# Patient Record
Sex: FEMALE | Race: WHITE
[De-identification: names, ages, dates, MRNs, and addresses within clinical notes are randomized per-mention and may not be internally consistent; named-entity substitution may affect disease eponyms.]

---

## 2020-12-04 ENCOUNTER — HOSPITAL ENCOUNTER (OUTPATIENT)
Dept: HOSPITAL 95 - MHTC | Age: 70
Discharge: HOME | End: 2020-12-04
Attending: INTERNAL MEDICINE
Payer: COMMERCIAL

## 2020-12-04 VITALS — WEIGHT: 281.53 LBS | BODY MASS INDEX: 49.88 KG/M2 | HEIGHT: 62.99 IN

## 2020-12-04 DIAGNOSIS — Z79.899: ICD-10-CM

## 2020-12-04 DIAGNOSIS — Z88.5: ICD-10-CM

## 2020-12-04 DIAGNOSIS — I35.0: Primary | ICD-10-CM

## 2020-12-04 DIAGNOSIS — E66.01: ICD-10-CM

## 2020-12-04 DIAGNOSIS — F41.9: ICD-10-CM

## 2020-12-04 DIAGNOSIS — I25.10: ICD-10-CM

## 2020-12-04 DIAGNOSIS — I10: ICD-10-CM

## 2020-12-04 PROCEDURE — B201YZZ PLAIN RADIOGRAPHY OF MULTIPLE CORONARY ARTERIES USING OTHER CONTRAST: ICD-10-PCS | Performed by: INTERNAL MEDICINE

## 2020-12-04 PROCEDURE — 4A023N7 MEASUREMENT OF CARDIAC SAMPLING AND PRESSURE, LEFT HEART, PERCUTANEOUS APPROACH: ICD-10-PCS | Performed by: INTERNAL MEDICINE

## 2020-12-04 NOTE — NUR
RIGHT RADIAL TR BAND HAS BEEN FULLY DEFLATED, SITE IS CLEAN AND DRY. NO
BLEEDING OR SWELLING NOTED. PT DENIES PAIN OR DISCOMFORT. CALL LIGHT IN REACH.

## 2020-12-04 NOTE — NUR
PT BACK TO RECOVERY ROOM VIA RECLINER AFTER PROCEDURE. RIGHT RADIAL TR BAND
AND SPLINT IN PLACE ON RIGHT WRIST. SITE IS CLEAN AND DRY, NO BLEEDING OR
SWELLING NOTED.

## 2020-12-04 NOTE — NUR
IV DC'D, CATH INTACT. PT GIVEN DC INSTRUCTIONS, FOLLOW UP INFORMATION.
VERBALIZED UNDERSTANDING. RIGHT RADIAL SITE REMAINS SOFT AND NON-TENDER. NO
BLEEDING OR SWELLING NOTED. PT OUT TO CAR VIA WHEELCHAIR.

## 2024-08-14 NOTE — NUR
PT ADMITTED FOR CORS, UPON ARRIVAL PT SOB WITH SATS 86-89% ON RA, LABORED
BREATHING. PT STATES SHE IS ALLERGIC TO MAG AND K+, HAS NOT BEEN TAKING K+
WITH LASIX, DOES NOT ALWAYS TAKE LASIX AS PRESCRIBED PER PT REPORT. PT
ANXIOUS. C/O PAIN TO LOWER LEGS, LEGS MILDLY WARM/RED W OPEN SORES; PT STATES
LEGS ARE IMPROVED. PT PLACED ON O2 AT 2L VIA N/C. SATS 88-92%, PT HOLDS HER
BREATH AT TIMES. DR PITT NOTIFIED/UPDATED. BNP ORDERED. PT TO BE ADMITTED
TO HOSPITALIST SERVICE. PT'S SON UPDATED PER PT REQUEST, DR PITT SPOKE
WITH PT'S SON VIA PHONE.

## 2024-08-14 NOTE — NUR
ASSUMED CARE OF PT FROM CATH LAB RN AT 1117 AM WHEN SHE ARRIVED TO PCU 10. SEE
DOCUMENTED VS AND ASSESSMENT. DR MCKEON IN ROOM TO SEE PT AND ORDERS CLARIFIED
WITH HER BY THIS RN. PT GIVEN FIRST DOSE OF BUMEX AND HAS HAD 1800ML OF URINE
OUTPUT SINCE. BP HAS DECREASED /66 MAP 76, AND THIS RN ATTEMPTED TO
CONTACT DR MCKEON FOR HOLDING PARAMETERS FOR BUMEX IV PUSH AS LOWERING
BP/OVER-DIRUESIS IN CRITICAL AORTIC STENOSIS CAN CAUSE PT TO DECOMPENSATE
FURTHER D/T LOSS OF PRE-LOAD. PT IS ABLE TO USE CALL LIGHT FOR NEEDS, ALERT
AND ORIENTED X 4. WILL CONTINUE TO MONITOR AND GIVE REPORT TO NOC SHIFT RN.
THIS RN WILL CONTINUE TO TRY TO CONTACT DR MCKEON FOR ORDERS.

## 2024-08-14 NOTE — NUR
PT BEING ADMITTED R/T INCREASING SOB AND DECOMPENSATED HF; PLAN IS TO PERFORM
ANGIOGRAM TOMORROW.  REPORT CALLED TO ROJELIO LAYNE; ALL QUESTIONS ANSWERED.  PT
TRANSFERRED TO PCU 10, CONDITION STABLE.

## 2024-08-15 NOTE — NUR
SHIFT SUMMARY
PATIENT ALERT, ORIENTED x4. ABLE TO MAKE NEEDS KNOWN TO STAFF. BP STABLE.
PATIENT ON 2L NC DURING THE NIGHT WITH SPO2 LOW TO MID 90s, PATIENT WILL DESAT
AT TIMES WHEN O2 OUT OF NOSE BUT OTHERWISE TOLERATES 2L. CONTINUOUS CARDIAC
MONITORING ON DURING THE SHIFT, DENIED CHEST PAIN. PATIENT SELPT IN CHAIR FOR
SHIFT FOR COMFORT. PATIENT AMBULATING INTO BATHROOM SBA, ADEQUATE OUTPUT.
STANDING WEIGHT DONE THIS MORNING. NPO FOR POSSIBLE ANGIO TODAY. NO OTHER
CHANGES, WILL REPORT TO DAY SHIFT RN.

## 2024-08-15 NOTE — NUR
ASSUMED CARE OF PT AT 0700 THIS AM. PT REMAINED NPO FOR CATH LAB THIS AM, PT
IN CATH LAB FROM [id number]. R RADIAL SITE AND R AC VENOUS ACCESS RECOVERED W/O
ANY COMPLICATIONS NOTED. PT UNABLE TO LIE FLAT/RAISE ARMS FOR CT-PE, WILL
RE-EVALUATE TOMORROW AFTER FURTHER DIURESIS. PT RETURNED FROM CATH LAB ACTING
DIFFERENT THAN WHEN SHE LEFT, SHAKING, MOANING AND HAVING DIFFICULTY FORMING
SENTICES. DR MCKEON NOTIFIED, PT HAD RETURNED TO HER BASELINE BEFORE SHE COULD
EVALUATE HER. DRESSINGS TO BLEs CHANGED PER ORDERS, NYSTATIN POWDER APPLIED TO
REDNESS IN FOLDS. PT FAMILY VISITED AND WERE UPDATED ON PROGRESS. NO FURTHER
CHANGES TO CONDITION NOTED. PT IS ABLE TO MAKE NEEDS KNOWN AND USE CALL LIGHT
APPROPRIATELY. CALL LIGHT IN REACH. WILL CONTINUE TO MONITOR AND GIVE REPORT
TO ON COMING NOC SHIFT RN.

## 2024-08-16 NOTE — NUR
8/15 FALL @2200
 
PT TO CHARGE RN STATION. REQUESTING ASSISTANCE WITH DRESSINGS ON LEGS AND
WANTING "FRESH AIR". STAFF TO PATIENT'S SIDE TRYING TO ASSIST BACK TO ROOM. PT
NOT WANTING TO INITIALLY BUT SUCCUMBED TO REQAUESTS. STAFF AT SIDE. PT TURNED
AROUND, SLIPPED ON WOUND DRESSINGS, AND FELL. STAFF DID NOT WITNESS PT HITTING
HEAD, PT DENIES THIS. AT TIME OF FALL AND HOUR POST, NO NEURO CHANGES NOTED,
NO MUSCULOSKELETAL DEFORMITY NOTED. FOLLOW UP ASSESMENT WITHOUT CHANGES. 
NOTIFIED. VSS.

## 2024-08-16 NOTE — NUR
UPDATE
 
WHEN IN [facility], PT FELT DIZZY AND LEANED FORWARD QUICKLY UNTIL SHE RESTED
HER HEAD ON THE WALL. NO OPEN SKIN OR MARKS ASSESSED AT THIS TIME, PT STATES
THAT SHE FEELS FINE. MD NOTIFIED, NO ORDERS AT THIS TIME.

## 2024-08-16 NOTE — NUR
ASSUMPTION OF CARE:
THIS RN ASSUMED CARE OF PT AT APPROX 1915. PT ALERT, ORIENTED X3-4 THIS
EVENING. FOLLOWING MOST COMMANDS. EMOTIONAL AT START OF SHIFT W/ NOSE BLEED;
NOSE BLEED RESOLVED WITHIN MINUTES & PT BECAME MORE CALM. SITTING UP IN CHAIR,
HAS SET CHAIR ALARM OFF X1 BUT WAS REDIRECTABLE TO SIT BACK. SON TO BEDSIDE
THIS PM FOR VISIT. VSS. ON 2L O2 VIA NC W/ SPO2 >90%. HR 80'S, SINUS ON TELE.
BP STABLE. UP TO RESTROOM FOR LARGE BM W/ 1P ASSIST & FWW/GB. TOLERATING PO
INTAKE, 1500ML FLUID RESTRICITON IN PLACE W/ STRICT I&O. NO OTHER NEEDS AT
THIS TIME. PT MED W/ TELE STATUS, PLANS TO TRANSFER TO ROOM [facility] THIS EVENING.
CALL LIGHT IN REACH, CHAIR ALARM IN PLACE.

## 2024-08-16 NOTE — NUR
TRANSFER 
PT TRANSFERRED TO ROOM 353 BY THIS RN AND RUEL RN. PT TRANSFERRED W/ ALL
PERSONAL BELONGINGS, NO CHANGES FROM PREVIOUS NOTE OR SHIFT ASSESSMENT AT TIME
OF TRANSFER. ATTEMPTED TO NOTIFY PT'S SON OF TRANSFER PER PT REQUEST, UNABLE
TO REACH W/ CONTACT NUMBER IN CHART.

## 2024-08-16 NOTE — NUR
SHIFT SUMMARY
PATIENT ALERT, ORIENTED x2-3, CAN BE FORGETFUL AT TIMES. ABLE TO MAKE NEEDS
KNOWN TO STAFF. BP STABLE. PATIENT ON 2L NC WITH SPO2 LOW TO MID 90s, WILL
DESAT WHEN OXYGEN IS OUT OF NOSE. ON CARDIAC MONITORING DURING THE NIGHT, NO
EVENTS NOTED. NO CHEST PAIN THIS SHIFT. RIGHT RADIAL SITE RECOVERED, NO
HEMATOMA NOTED. ARM BOARD IN PLACE.  PATIENT SITTING IN CHAIR FOR NIGHT FOR
COMFORT. PATIENT DID HAVE GLF THIS SHIFT AFTER SLIPPING ON KERLEX, SEE FALL
ASSESSMENT. NO INJURIES NOTED, NO CHANGES TO VITAL SIGNS. AMBULATING INTO
BATHROOM STANBY ASSIST WITH FWW. OTHERWISE, NO CHANGES THIS SHIFT, WILL REPORT
TO DAY SHIFT RN.

## 2024-08-16 NOTE — NUR
PT TRANSFERRED FROM PCU. A/OX3, IS DEFENSIVE WHEN ASKING ORIENTATION
QUESTIONS. STATES SHE IS "COMPLETELY WITH IT" BUT MENTATION SEEMS OFF EVEN
THOUGH ORIENATION QUESTIONS ARE CORRECT, SHE CAN NOT REMEMBER WHY CERTAIN
THINGS ARE NEEDED SUCH AS PULSE OX AND NASAL CANNULA AT 2L, WHICH SHE TAKES
OFF FREQUENTLY. OR THE ARM BOARD PROTECTING HER RADIAL SITE POST
ANGIOGRAM. SHE IS NOT SURE WHY SHE HAS TO STAY IN THE HOSPITAL EVEN THOUGH
MYSELF AND PCU RN EDUCATED HER ON PLAN OF CARE AND FUTURE [facility] [place]. PT LIVES ALONE, HAD A FALL LAST NIGHT ON PCU. PT IS ALSO CONCERNED
ABOUT HOW SHE WILL GET TO [place] IF THE PLAN PER HOSPITALIST NOTE WAS TO
DISCHARGE DAY PRIOR TO APPT FOR HER TO GET THERE ON TIME. WILL PASS ON TO DAY
SHIFT RN AS CARE MANAGMENT IS NOT HERE ON SATURDAYS A NEED FOR POSSIBLE
TRANSPORTATION TO [place].
PT REPORTS PAIN TO JOSEFINA PERKINS, PRN TYLENOL GIVEN.
PER PCU NURSE PT HAD BECOME INCREASINGLY AGITATED POST AMBIEN
ADMINISTRATION LAST NIGHT THAT SHE HAS AVAILABLE PRN INSOMNIA. PT AWARE THAT
SHE WAS AGITATED LAST NIGHT AND APALOGIZES.
PT WAS ASKING FOR AMBIEN
UPON COMING TO MED SURG UNIT. NOTIFIED NP TOBY WHO ORDERED A ONE TIME
DOSE OF 6 MG MELATONIN TO TRY TONIGHT.

## 2024-08-16 NOTE — NUR
SHIFT SUMMARY
 
PT MENTATION AND ORIENTATION VARYING BETWEEN A&Ox4 TO A&Ox2. PT HAD MOMENT OF
CONFUSION AND AGITATION, RIPPING EVERYTHING OFF AND TRYING TO LEAVE TO GO TAKE
CARE OF HER SON. PT WAS VERY DIFFICULT TO REORIENT AND WAS GETTING VERY
AGITATED WITH STAFF TRYING TO HELP HER. BP SOFT WITH SBP 90, MAP> 65, REPORTS
DIZZINESS AT TIMES. SINUS 90's, DENIES CP/PRESSURE. DISCUSSED BP WITH
CARDIOLOGIST PRIOR TO ADMINISTERING 1800 DOSE OF IV BUMEX, INSTRUCTED TO ONLY
ADMINISTER 1mg IV BUMEX D/T BP. SpO2> 92% 2L VIA NC, REPORTS SOB WHEN LYING
FLAT RESULTING IN PT REFUSING TO LAY IN BED RATHER THAN RECLINER. CONTINENT OF
URINE WITH GOOD OUTPUT, NO BM THIS SHIFT. SBA WITH FWW TO BATHROOM. NO C/O
PAIN. NO OTHER EVENTS, WILL REPORT TO ONCOMING RN.

## 2024-08-17 NOTE — NUR
PT WOKE UP CONFUSED/AGITATED. ATTEMPTING TO GET OUT OF CHAIR, CHAIR ALARM
GOING OFF. PT IS STILL DROWSY AND TREMORS ARE WORSENED WITH AGITATION. TOOK 3
STAFF MEMBERS TO HAVE HER STAND SAFELY AND RETURN HER TO THE CHAIR. PT IS
PARANOID, STATING WE MOVED HER TO THIS UNIT TO HARM HER. ATTEMPTED TO CALM
PT DOWN BY TALKING TO HER AND THE MORE SHE TALKED THE DROWSIER SHE GOT SITTING
UP IN THE CHAIR. CHAIR WAS RECLINED AND PT IS NOW RESTING EYES CLOSED. 2L NC
RESPIRATIONS UNLABORED. CONTINUOUS PULSE OX. TELEMETRY MONITORING.

## 2024-08-17 NOTE — NUR
CALLED DR CHRISTENSEN-
PT HAS SEVERE AS. SBP 84 THIS AM AND SHE C/O WEAKNESS. WHEN AMBULATING TO THE
BATHROOM HER KNEES APPEARED TO BUCKLE AND STAFF PROVIDED THE PT WITH HANDS ON
ASSISTANCE AND SHE WAS ABLE TO CATCH HERSELF WITHOUT FALLING. DAILY WT WAS
COMPLETED WITH PT STANDING. .4KG. PT UP IN RECLINER, STATES SHE CAN NOT
BREATHE IN THE BED. PT BEING DIURESED. CALLED  TO TALK ABOUT EITHER TRANSFER
FOR BP SUPPORT WHILE DIURETICS CONTINUE OR HOLDING DIURETICS THIS AM. WAITING
FOR A CALL BACK.

## 2024-08-17 NOTE — NUR
SHIFT SUMMARY-
PT ALERT AND ORIENTED TO SELF. SHE HAD C/O LEG PAIN THIS EVENING, MEDICATED
WITH TYLENOL, WITH LITTLE EFFECT. CHARGE RN ASSISTED WITH EVEING MEDS. 
PT MEDICATED WITH MIDODRINE AND GABAPENTIN. BP RECHECK AT CHANGE OF SHIFT
SHOWS SBP 88-90. IV BUMEX NOT YET GIVEN. PT IS UP IN THE RECLINER SHE HAS BEEN
AGGITATED AND IMPULSIVE ALL SHIFT. SHE REFUSED TO CALL FOR STAFF AND NEARLY
FELL EARLIER IN THE DAY. REQUESTED A CLINICAL SITTER FOR THE PT. NIGHT RN
AWARE THE DR WANTS THE PT TO RECIEVE IV BUMEX, HOWEVER THIS RN IS CONCERNED
THAT THE PT SBP IS TOO LOW TO SUPPORT ADMINISTRATION OF IV BUMEX ON THIS UNIT
SAFELY. PT DID RECIEVE IV ALBUMIN EARLIER TODAY WITH HER FIRST DOSE OF BUMEX.
THIS EVENING SHE APPEARS MORE GROGGY AND SPEECH IS NOT AS CLEAR. PT DID
REQUEST NO MORE TRAZADONE OR AMBIEN WHEN SHE SPOKE TO ROJELIO ALTMAN EARLIER TODAY.
 
NIGHT RN AWARE OF LOW BP AND IS CONTACTING THE NIGHT HOSPITALIST FOR FURTHER
ORDERS.
 
PT HAS A CLINICAL SITTER AT THE RECLINER SIDE CURRENTLY. CALL LIGHT IS IN
REACH. PT A LITTLE MORE LETHARGIC THIS EVENING WHEN COMPARED TO THIS
AFTERNOON.

## 2024-08-17 NOTE — NUR
SPOKE TO DR CHRISTENSEN AT THE BEDSIDE-
PLAN IS TO ADD MITODRENE AND USE ALBUMIN TO HELP WITH MAINTAINING BP WITH THE
CONTINUED DIURESIS. ORDER RECIEVED TO ADMINISTER THE IV BUMEX AS ORDERED.

## 2024-08-18 NOTE — NUR
SHIFT SUMMARY
PT CONFUSED, IRRITABLE, PARANOID AT BEGINNING OF SHIFT. 1;1 SITTER PRESENT FOR
HIGH FALL RISK AND PULLING AT OXYGEN TUBING. PT WAS BECOMING INCREASINGLY
AGITATED AROUND MIDNIGHT. PT WAS NOT REDIRECTABLE OR DISTRACTED. ATTEMPTED TO
GET AHOLD OF SON WHO DID NOT ANSWER. PT EDUCATED ON REQUIREMENT FOR O2, BUT
SHE KEEPS TAKING OFF NASAL CANNULA SWITCHED HER TO SIMPLE MASK TO GIVE HER DRY
NOSE A BREAK AS SHE HAS HAD MULTIPLE NOSE BLEEDS AND SHE IS REFUSING TO LEAVE
THIS IN PLACE AS WELL, DESATTING DOWN TO 70%. VERBALLY THREATENING
STAFF AND SWINGING AT STAFF IF WE ATTEMPT TO REPLACE OXYGEN MASK. MD NOTIFIED
WHO ORDERED ONE TIME DOSE OF ZYPREXA AND NURSING CLINICAL JUDGEMENT TO START
RESTRAINTS. MD MADE AWARE AND ORDER PLACED. RESTRAINTS STARTED AT 0130. PT
FELL ASLEEP SHORTLY AFTER. CONTINUOUS PULSE OX PRESENT. SATS ABOVE 95%.
RESPIRATIONS EVEN AND UNLABORED. CALL LIGHT IN REACH BUT DOES NOT USE
APPROPRIATELY. 1:1 SITTER PRESENT.

## 2024-08-18 NOTE — NUR
TRANSFER TO PCU/SHIFT SUMMARY
PT TRANSFERRED TO PCU AT 1035 FOR AMS. REPORT FROM LISA SERRATO. PT ARRIVES ALERT
TO SELF, HOSPITAL AND YEAR. FOLLOWS SIMPLE DIRECTIONS. OCCASIONALLY IRRITABLE,
TEARFUL AND AGITATED BUT REDIRECTABLE. RESTRAINTS REMOVED. SITTER CONTINUES
FOR PT SAFETY. HAS OCCASIONALLY NEEDED TO REPLACE O2 MASK. PT MAKES CONFUSED
STATEMENTS AT TIMES. AGITATED WHEN SON IN ROOM. SON UPDATED. LUNGS DIM
THROUGHOUT, ON 3L VIA OXYMASK. SINUS ARRHYTMIA. BP STABLE. RODRIGEZ PLACED FOR
URINARY RETENTION ON ARRIVAL TO PCU. KAMILA AREA RED AND MOIST. WILL CONTINUE
PLAN OF CARE UNTIL REPORT TO ONCOMING NURSE.

## 2024-08-18 NOTE — NUR
TRANSFER NOTE-
SPOKE TO DR CHRISTENSEN, HE ORDER THE PT TO TRANSFER BACK TO PCU. TELEPHONE REPORT
COMPLETED WITH PCU RN. PT IN BED, SOFT RESTRAINTS IN PLACE FOR SAFETY. PT
BECOMING AGGITATED AS SHE STAES SHE NEEDS TO GO PEE. PT WAS INFORMED SHE HAS
AN ATTEND IN PLACE AS WELL AS A PUREWICK, OFFERED A BED PAN. PT VERY GROGGY
AND IS A FALL RISK. SOFT RESTRAINTS PLACED TO KEEP STAFF SAFE AS THE PT WAS
COMBATIVE EARLIER, STRIKING A STAFF MEMBER. PT ATTEMPTED TO VOID AND STATED
SHE DID, NO OUTPUT IN THE PUREWICK. REPORT COMPLETED WITH PCU RN. SHE ASKED
THAT THE PT BE TRANSFERED AT THIS TIME AND SHE WOULD MANAGE THE PT BLADDER
NEEDS. TELE TECH NOPTIFIED OF PT TRANSFER.

## 2024-08-18 NOTE — NUR
PATIENTS SON WAS CALLING THIS AM WHILE GIVING REPORT TO ONCOMING NURSE LISA.
UPDATES OF LAST NIGHTS EVENT GIVEN. PT EDUCATED ON REASONING FOR INTERVENTIONS
AND RESTRAINT REQUIREMENTS. VERBALIZED UNDERSTANDING. HAD NO FURTHER
QUESTIONS.

## 2024-08-19 NOTE — NUR
SHIFT NOTE:
PT ALERT AND ORIENTED TO SELF ONLY. SHE IS UNABLE TO USE THE CALL LIGHT OR
APPROPRIATELY COMMUNICATE HER NEEDS. SHE IS ON TELE AND HAD TWO EPISODES OF
ASYMPTOMATIC EPISODES OF SVT T/O SHIFT. SHE IS ON 3L NC AND FREQUENTLY DESATS
SLOW TO RECOVER. 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
BREATHER. SHE HAS A RODRIGEZ FOR ACUTE RETENTION. RODRIGEZ IRRIGATED TO ENSURE
PATENCY. SHE HAS BEEN Q2 TURNED T/O SHIFT. SHE BECAME IRRITABLE AND CRYING OUT
IN PAIN WITH REPOSITIONING. SHE WAS UNABLE TO FOLLOW COMMANDS TO TAKE PO, MD
NOTIFIED AND ORDERED IV TORADOL. SEE EMAR FOR ADMINISTRATION INSTRUCTIONS. PT
MOVED TO RECLINER FOR COMFORT. 1:1 SITTER AT BEDSIDE. WILL CONTINUE TO MONITOR
AND REPORT TO ONCOMING RN

## 2024-08-19 NOTE — NUR
ASSUMED CARE
PT IS A&O X2-3; PT AT TIME OF THIS NOTE HAS NOT SHOWN TO BE IMPULSIVE EXCEPT
FOR ONE ATTEMPT TO GET OUT OF CHAIR PER PT; EDUCATED ON IMPORTANCE OF USING
CALL LIGHT. PT DENIES CP, SOB, AND NAUSEA AT THIS TIME. OCCASIONAL
NONPRODUCTIVE COUGH NOTED. CRACKLES BILATERAL T/O. PT'S FAMILY IN ROOM W/
COBRA TX ABOUT TO TAKE PT. REPORT WAS GIVEN TO WALTER IN CVU.

## 2024-08-19 NOTE — NUR
Shift summary.
Pt up to recliner most of shift. Sitter at bedside. Pt cooperative with care
this shift, pleasant. Plan is to transfer pt to [facility] for
further care. Up with OT this shift, no acute events. See chart for further
details.